# Patient Record
Sex: FEMALE | Race: WHITE
[De-identification: names, ages, dates, MRNs, and addresses within clinical notes are randomized per-mention and may not be internally consistent; named-entity substitution may affect disease eponyms.]

---

## 2017-06-12 NOTE — HP
HISTORY AND PHYSICAL:

 

DATE OF ADMISSION:  06/22/17

 

DATE OF OFFICE VISIT:  06/09/17

 

SURGEON:  Amelia Sampson MD. (DICTATED BY IMAN WORTHINGTON)

 

PROCEDURE:  Left knee arthroscopy with partial medial meniscectomy, possible 
synovectomy, possible chondroplasty.

 

CHIEF COMPLAINT:  Left knee pain.

 

HISTORY OF PRESENT ILLNESS:  Sarah Beth is a 51-year-old female with complaints of 
left knee pain.  An MRI showed a medial meniscus tear and she elected to 
proceed with left knee arthroscopy.

 

PAST MEDICAL HISTORY:  Asthma and hypertension.

 

PAST SURGICAL HISTORY:  Tubal ligation, hernia repair, teeth extraction, 
tracheostomy.

 

CURRENT MEDICATIONS:

1.  Aspirin.

2.  Pro-Air HFA.

3.  Lisinopril/hydrochlorothiazide.

4.  Albuterol.

5.  EpiPen.

 

ALLERGIES:  PENICILLIN, KEFLEX, PREDNISONE and BEE STINGS.

 

FAMILY HISTORY:  Family history of heart disease; emphysema; diabetes; lung, 
kidney, prostate cancer, and COPD.

 

SOCIAL HISTORY:  She is a 51-year-old female.  She lives with her .  She 
does not smoke or use drugs.  She uses occasional alcohol.

 

REVIEW OF SYSTEMS:  A complete 14-point review of systems is reviewed with the 
patient and was all negative and noncontributory.

 

                               PHYSICAL EXAMINATION

 

GENERAL:  She is well-developed, well-nourished, in no acute distress.

 

VITAL SIGNS:  She stands 5 feet 8 inches tall, weighs 197 pounds, her blood 
pressure is 128/81, her heart rate is 93.

 

HEENT:  Normocephalic and atraumatic.

 

NECK:  Supple.  No palpable lymph node.  Trachea is midline.

 

PULMONARY:  Lungs are clear to auscultation bilaterally.

 

CARDIO:  Regular rate and rhythm.  Strong S1, S2.  No murmurs, gallops, rubs.  
No peripheral edema.

 

ABDOMEN:  Soft, nontender, nondistended.

 

NEUROLOGIC:  She is alert and oriented x3.  Cranial nerves II through XII are 
intact.

 

MUSCULOSKELETAL:  Left lower extremity, the skin is intact.  There are no open 
wounds or abrasions.  She has some tenderness over the medial joint line.  
There is a mild joint effusion.  Her lower extremities muscle group strengths 
are intact at 5/5.  She has intact sensation.  2+ dorsalis pedis pulses.

 

 STUDIES:  An MRI of the left knee shows a medial meniscus tear and a moderate 
joint effusion.

 

ASSESSMENT AND PLAN:  Ms. Burleson is a 51-year-old female with complaints of left 
knee pain.  An MRI shows a left medial meniscus tear.  She has elected to 
proceed with a left knee arthroscopy with partial medial meniscectomy, possible 
synovectomy, and possible chondroplasty.  She will follow with Dr. Sampson 2 
weeks after the surgery.

 

 ____________________________________ IMAN WORTHINGTON

 

[id number]/[facility] #: [id number]

JUAN

## 2017-06-23 NOTE — OP
OPERATIVE NOTE:

 

DATE OF OPERATION:  06/22/17

 

DATE OF BIRTH:  [birth year]

 

ATTENDING SURGEON:  Amelia Sampson MD

 

ASSISTANT:  IMAN Florian Ms. did help throughout the procedure with preparation of the leg, wound retraction, manipu
lation of the knee, and wound closure.

 

ANESTHESIOLOGIST:  Dr. Fuller.

 

ANESTHESIA:  Spinal.

 

PRE-OP DIAGNOSIS:  Left knee pain with meniscal tear and osteoarthritis.

 

POST-OP DIAGNOSIS:  Left knee pain with displaced lateral meniscal tear, medial plica, moderate oste
oarthritis tricompartmental.

 

PROCEDURE PERFORMED:  Left knee arthroscopy with patrial lateral meniscectomy and medial plica excis
ion.

 

ESTIMATED BLOOD LOSS:  Less than 25 cc.

 

COMPLICATIONS:  None.

 

SPECIMEN:  None.

 

BRIEF HISTORY/INDICATIONS:  Ms. Mansi Villanueva is a 51-year-old female with many months of increasin
gly severe left knee pain.  Her history and physical exam were consistent with meniscal tear and MRI
 confirmed this.  She did have some baseline arthritis.  The patient elected to proceed with left kn
ee arthroscopy and partial meniscectomy with possible chondroplasty and synovectomy.  Informed conse
nt was obtained from the patient.  She understood the risks of surgery included, but were not limite
d to bleeding, infection, damage to nearby structures, continued pain, need for further surgery, str
tatiana, heart attack, blood clot, and death.  She wished to proceed.

 

Specifically, the patient understood she has continued to have some arthritic pain and may have a re
-tear of the meniscus in the future.

 

INTRAOPERATIVE FINDINGS:  Intraoperatively, the patient was noted to have moderate to severe arthrit
is in the patellofemoral compartment and medial compartment, mild to moderate arthritis in the later
al compartment.  These were grade 3 and 4 Outerbridge cartilage changes in the medial and patellofem
oral compartments.  She had a large bucket handle type tear of the body of the lateral meniscus, whi
ch was displaced into the joint and involved the white-white and white-red zone. Posterior medial me
niscus was not well visualized or easily displaced.  ACL appeared to be intact.

 

DESCRIPTION OF PROCEDURE:  Ms. Mansi Villanueva is a 51-year-old female who is identified in the [facility] unit.  Her left lower extremity was marked as the correct operative site.  Informed consent
 was signed and placed in the chart.  The patient was taken to the operating room and placed under s
harshad anesthesia without difficulty.  Her left lower extremity was prepped and draped in the usual s
terile fashion.  Preop time-out was made to correctly identify the patient's side and site.  Appropr
iate perioperative antibiotics were given within 1 hour of incision.

 

A 0.5-cm anterolateral portal incision was made with a 15-blade and carried down through the capsule
.  As soon as the light and water sources were turned on, the knee was easily visualized.  A tour of
 the knee joint was performed.  Suprapatellar pouch had no obvious abnormality.  Patellofemoral comp
artment showed some grade 3 and 4 Outerbridge cartilage changes with exposed subchondral bone.  Ther
e was a large medial plica, which did impinge with knee range of motion.  Medial gutter showed no ob
vious loose body.  Medial compartment showed some grade 3 and 4 Outerbridge cartilage changes with e
xposed subchondral bone along the medial femoral condyle.  Medial meniscus showed no obvious menisca
l tear.  ACL appeared to be intact.  The knee was placed in a figure-of-four position.  There was a 
large displaced anterior meniscal tear of the lateral meniscus.  Lateral compartment had minimal deg
enerative changes.  Lateral gutter had no obvious loose body or plica.

 

Under direct visualization, a medial portal incision was made.  A probe was introduced and a second 
tour of the knee joint was performed.  No significant posterior meniscal tear was noted along the me
dial meniscus.  Shaver and radiofrequency ablation wand were used to excise the medial plica until t
here was no longer any impingement with range of motion.  Medial and patellofemoral compartments wer
e noted to have significant amount of exposed subchondral bone. The knee was placed in [id number]
r position.  Shaver and straight biter were used to excise the bucket handle type tear of the body o
f the lateral meniscus. This was in the white-white and white-red zone.  A smooth border of the meni
scus was obtained.  Probing of the meniscus showed no further tears.

 

The knee was copiously irrigated with sterile saline.  All instruments were carefully removed.  The 
incisions were closed using interrupted 3-0 nylon suture. Intraarticular injection of 80 mg of Depo-
Medrol and 6 cc of 0.25% Marcaine was placed in the knee joint.  Sterile Xeroform, 4x4's, and Webril
 were used to cover the incision.  Ace wrap and cold pack were placed over this.

 

The patient's anesthesia was reversed without difficulty.  She was taken to the PACU in stable condi
tion.  Intended weightbearing will be weightbearing as tolerated.  Intended DVT prophylaxis will be 
aspirin.  She will follow up in 2 weeks' time for suture removal.

 

 [id number]/[facility] #: [id number]

## 2018-06-07 ENCOUNTER — HOSPITAL ENCOUNTER (EMERGENCY)
Dept: HOSPITAL 25 - UCCORT | Age: 53
Discharge: HOME | End: 2018-06-07
Payer: SELF-PAY

## 2018-06-07 VITALS — SYSTOLIC BLOOD PRESSURE: 148 MMHG | DIASTOLIC BLOOD PRESSURE: 96 MMHG

## 2018-06-07 DIAGNOSIS — Z88.1: ICD-10-CM

## 2018-06-07 DIAGNOSIS — I10: ICD-10-CM

## 2018-06-07 DIAGNOSIS — Z88.0: ICD-10-CM

## 2018-06-07 DIAGNOSIS — Z88.8: ICD-10-CM

## 2018-06-07 DIAGNOSIS — Z91.09: ICD-10-CM

## 2018-06-07 DIAGNOSIS — J45.901: Primary | ICD-10-CM

## 2018-06-07 DIAGNOSIS — R05: ICD-10-CM

## 2018-06-07 DIAGNOSIS — Z87.891: ICD-10-CM

## 2018-06-07 PROCEDURE — 87651 STREP A DNA AMP PROBE: CPT

## 2018-06-07 PROCEDURE — G0463 HOSPITAL OUTPT CLINIC VISIT: HCPCS

## 2018-06-07 PROCEDURE — 99212 OFFICE O/P EST SF 10 MIN: CPT

## 2018-06-07 NOTE — UC
Throat Pain/Nasal Sandeep HPI





- HPI Summary


HPI Summary: 





53 y/o female presents to the urgent care c/o sore throat, productive cough for 

the past 2 days. Pt reports cough now has triggered her asthma since he 

developed wheezing this morning. Pt states she has been exposed to strep at her 

daughter's . Cough worse at night, unable to sleep last night.  Pain w/ 

swallowing is 9/10. She has not done her nebulizer tx yet. Pt request a note 

for work. Pt denies fever, SOB, chest pain, abdominal pain, N/V/D. Pt has 

multiple alleries to medications. She is allergic to Prednisone PO.








- History of Current Complaint


Stated Complaint: SORE THROAT,COUGH,RESPIRATORY


Time Seen by Provider: 06/07/18 10:31


Hx Obtained From: Patient


Hx Last Menstrual Period: November


Pregnant?: No - menopausal


Onset/Duration: Gradual Onset, Lasting Days - 2 days, Still Present, Worse 

Since - yesterday


Severity: Mild


Pain Intensity: 0


Pain Scale Used: 0-10 Numeric


Cough: Sputum Appears - yellowish


Associated Signs & Symptoms: Positive: Dysphagia, Nasal Discharge





- Epiglottits Risk Factors


Epiglottis Risk Factors: Negative





- Allergies/Home Medications


Allergies/Adverse Reactions: 


 Allergies











Allergy/AdvReac Type Severity Reaction Status Date / Time


 


cephalexin Allergy  Rash & Verified 06/07/18 10:57





   throat  





   swelling  


 


Penicillins Allergy  Swelling Verified 06/07/18 10:56





   of Throat,  





   rash  


 


Perfume [Fragrance] Allergy  Coughing Verified 06/22/17 06:12


 


prednisone Allergy  Hallucinati Verified 06/07/18 10:47





   ons  


 


bee sting Allergy  Swelling Uncoded 06/07/18 10:56





   Of  





   Face,Lips,&  





   Throat  











Home Medications: 


 Home Medications





Al Hydrox/Mg Hydrox/Elin BULK* [Mylanta - BULK BOT*] 1 callie PO SEE INSTRUCTIONS 

PRN 06/07/18 [History Confirmed 06/07/18]


Multivitamin [Multivitamins] 1 cap PO DAILY 06/07/18 [History Confirmed 06/07/18

]











PMH/Surg Hx/FS Hx/Imm Hx


Previously Healthy: Yes


Cardiovascular History: Hypertension


Respiratory History: Asthma





- Surgical History


Surgical History: Yes


Surgery Procedure, Year, and Place: s/p Trach [id number].  tubal ligation.  

hernia 1999





- Family History


Known Family History: Positive: Cardiac Disease, Hypertension, Diabetes


Family History: cancer, suicide in father





- Social History


Occupation: Employed Full-time


Lives: With Family


Alcohol Use: Occasionally


Substance Use Type: None


Smoking Status (MU): Former Smoker


Amount Used/How Often: SOCIALLY X 15 YEARS


Have You Smoked in the Last Year: No


When Did the Patient Quit Smoking/Using Tobacco: 25 years ago





- Immunization History


Most Recent Influenza Vaccination: Not the 2016/2017 Season


Most Recent Tetanus Shot: unknown





Review of Systems


Constitutional: Negative


Skin: Negative


Eyes: Negative


ENT: Sore Throat, Nasal Discharge, Sinus Congestion


Respiratory: Cough - productive yellowish phlegm, Other - wheezing


Cardiovascular: Negative


Gastrointestinal: Negative


Genitourinary: Negative


Motor: Negative


Neurovascular: Negative


Musculoskeletal: Negative


Neurological: Negative


Psychological: Negative


Is Patient Immunocompromised?: No


All Other Systems Reviewed And Are Negative: Yes





Physical Exam





- Summary


Physical Exam Summary: 





Vital Signs Reviewed: Yes


General: well developed, well nourished female sitting in the examining table w/

o any apparent distress


Eyes: Positive: Conjunctiva Clear - PERRLA, EOMI, fundi grossly normal


ENT: Positive: Normal ENT inspection, Hearing grossly normal, Pharynx mild 

erythema, Nasal congestion - edematous and erythematous nasal mucosa, Nasal 

drainage - yellowish drainage, TMs normal. Negative: Tonsillar swelling, 

Tonsillar exudate


Neck: Positive: Supple, Nontender, No Lymphadenopathy


Respiratory: no orthopnea or dyspnea. Able to speak in full sentences, no 

retractions or accessory muscle use, no tripod position, stridor, or head 

bobbing. Positive breath sounds  bilaterally, scattered wheezing in B/L 

postrior lungs w/ mild rhonchi, no crackles or rales..


Cardiovascular: Positive: RRR, No Murmur, Pulses Normal, Brisk Capillary Refill


Abdomen Description: Positive: Nontender, No Organomegaly, Soft. Negative: CVA 

Tenderness (R), CVA Tenderness (L)


Bowel Sounds: Positive: Present


Musculoskeletal Exam: Normal


Musculoskeletal: Positive: Strength Intact, ROM Intact, No Edema


Neurological Exam: Normal


Psychological Exam: Normal


Skin Exam: Normal








Triage Information Reviewed: Yes





Throat Pain/Nasal Course/Dx





- Course


Course Of Treatment: 53 y/o female presents to the urgent care c/o sore throat, 

productive cough for the past 2 days. Pt reports cough now has triggered her 

asthma since he developed wheezing this morning. Pt states she has been exposed 

to strep at her daughter's . Cough worse at night, unable to sleep last 

night.  Pain w/ swallowing is 9/10. She has not done her nebulizer tx yet. Pt 

request a note for work. Pt denies fever, SOB, chest pain, abdominal pain, N/V/

D. Pt has multiple alleries to medications. She is allergic to Prednisone PO. 

Hx obtained. Pt w/ positive breath sounds  bilaterally, scattered wheezing in B/

L postrior lungs w/ mild rhonchi, no crackles or rales. O2SAt: 99%. Pt w/ 

Asthma exacerbation: due to Acute Bronchitis. Pt allergic to prednisonePO.  

Duoneb  Treatment given to patient. Patient tolerated well treatment and lungs 

improved, mild wheezing only in posterior RT lung, O2 sat 100%.   Patient 

prescribed Z-slava PO , Tessalon tabs PO and Duo neb Tx , as directed below.  The 

patient was recommended to increase fluid intake. Take medications as 

recommended and patient advised to continue treatments TID. Patient recommended 

to return to the clinic or go to the nearest ER if symptoms do not improve or 

worsen.  Patient understood and agree w/ plan of care. Note for work given. Pt 

left clinic ambulating and hemodynamically stable.





- Differential Dx/Diagnosis


Differential Diagnosis/HQI/PQRI: Influenza, Pharyngitis, Sinusitis, URI, Other 

- bronchitis, asthma exacerbation, pneumonia


Provider Diagnoses: 1- Asthma exacerbation due to bronchitis.  2- Cough.  3- 

Uncontrolled HTN





Discharge





- Sign-Out/Discharge


Documenting (check all that apply): Discharge/Admit/Transfer - D/c home





- Discharge Plan


Condition: Stable


Disposition: HOME


Prescriptions: 


Albuterol/Ipratropium NEB.SOL* [Duoneb (Albuterol 2.5 MG/Ipratropium 0.5 MG)] 1 

neb INH Q6H PRN #1 slava


 PRN Reason: Wheezing


Azithromyxin SLAVA (NF) [Z-Slava (Zithromax) 250 mg tabs #6] 2 tab PO .TODAY, THEN 

1 DAILY #6 tab


Benzonatate CAP* [Tessalon 100 MG CAP*] 100 mg PO TID PRN #21 cap


 PRN Reason: Cough


Patient Education Materials:  Asthma (ED), Acute Bronchitis (ED), Low-Sodium 

Diet (ED)


Forms:  *Work Release


Referrals: 


FHN Bell,FHN [Primary Care Provider] - 3 Days


Additional Instructions: 


1-Please take full course of antibiotic to avoid resistance. 


2-Take Tessalon PO tabs as directed and Due Duoneb nebulizer  to alleviate 

cough and wheezing. Increase fluid intake, rest and eat well. 


3- If symptoms do not improve or worsen or your develop SOB with fever and 

severe wheezing please go immediately to the ER further evaluation and 

treatment.


4- F/u with your PCP in 2-3 days for further management on your Asthma


5-Your BP is elevated today. please decrease salt in your diet, monitor BP and 

if it continues to be elevated please f/u with your PCP for further management

















- Billing Disposition and Condition


Condition: STABLE


Disposition: Home

## 2019-04-17 ENCOUNTER — HOSPITAL ENCOUNTER (EMERGENCY)
Dept: HOSPITAL 25 - UCCORT | Age: 54
Discharge: HOME | End: 2019-04-17
Payer: SELF-PAY

## 2019-04-17 VITALS — DIASTOLIC BLOOD PRESSURE: 88 MMHG | SYSTOLIC BLOOD PRESSURE: 153 MMHG

## 2019-04-17 DIAGNOSIS — J45.909: Primary | ICD-10-CM

## 2019-04-17 DIAGNOSIS — Z87.891: ICD-10-CM

## 2019-04-17 DIAGNOSIS — I10: ICD-10-CM

## 2019-04-17 DIAGNOSIS — Z88.0: ICD-10-CM

## 2019-04-17 PROCEDURE — G0463 HOSPITAL OUTPT CLINIC VISIT: HCPCS

## 2019-04-17 PROCEDURE — 99212 OFFICE O/P EST SF 10 MIN: CPT

## 2019-04-17 NOTE — UC
Respiratory Complaint HPI





- HPI Summary


HPI Summary: 


53-year-old female comes in with a chief complaint of upper respiratory tract 

infection symptoms for 2 days.  Patient has asthma and she's had increased 

wheezing.  Patient reports allergies to penicillin also is not able to take 

penicillin for her asthma exacerbations.  She is not a smoker.  She's had some 

chills but no fevers.  Her albuterol does help with the symptoms.





- History of Current Complaint


Chief Complaint: UCRespiratory


Stated Complaint: CONGESTION,COUGH,WHEEZY


Time Seen by Provider: 04/17/19 16:41


Hx Last Menstrual Period: November


Pain Intensity: 5





- Allergies/Home Medications


Allergies/Adverse Reactions: 


 Allergies











Allergy/AdvReac Type Severity Reaction Status Date / Time


 


bee venom protein (honey bee) Allergy  Swelling Verified 04/17/19 16:37





   Of  





   Face,Lips,&  





   Throat  


 


cephalexin Allergy  Rash & Verified 04/17/19 16:37





   throat  





   swelling  


 


Penicillins Allergy  Swelling Verified 04/17/19 16:37





   of Throat,  





   rash  


 


Perfume [Fragrance] Allergy  Coughing Verified 04/17/19 16:37


 


prednisone Allergy  Hallucinati Verified 04/17/19 16:37





   ons  














PMH/Surg Hx/FS Hx/Imm Hx


Previously Healthy: Yes


Cardiovascular History: Hypertension


Respiratory History: Asthma





- Surgical History


Surgical History: Yes


Surgery Procedure, Year, and Place: s/p Trach [id number].  tubal ligation.  

hernia 1999





- Family History


Known Family History: Positive: Cardiac Disease, Hypertension, Diabetes, Other 

- cancer, suicide in father


Family History: cancer, suicide in father





- Social History


Alcohol Use: Occasionally


Substance Use Type: None


Smoking Status (MU): Former Smoker


Amount Used/How Often: SOCIALLY X 15 YEARS


Have You Smoked in the Last Year: No


When Did the Patient Quit Smoking/Using Tobacco: 25 years ago





- Immunization History


Most Recent Influenza Vaccination: Not the 2016/2017 Season


Most Recent Tetanus Shot: unknown





Review of Systems


All Other Systems Reviewed And Are Negative: Yes


Constitutional: Positive: Chills


Skin: Positive: Negative


Eyes: Positive: Negative


ENT: Positive: Nasal Discharge, Sinus Congestion


Respiratory: Positive: Shortness Of Breath, Cough, Other - SEE HPI


Cardiovascular: Positive: Negative


Gastrointestinal: Positive: Negative


Motor: Positive: Negative


Neurovascular: Positive: Negative


Musculoskeletal: Positive: Negative.  Negative: Edema


Neurological: Positive: Negative


Psychological: Positive: Negative


Is Patient Immunocompromised?: No





Physical Exam


Triage Information Reviewed: Yes


Appearance: No Pain Distress, Well-Nourished, Ill-Appearing - MILD


Vital Signs: 


 Initial Vital Signs











Temp  98.5 F   04/17/19 16:23


 


Pulse  96   04/17/19 16:23


 


Resp  22   04/17/19 16:23


 


BP  153/88   04/17/19 16:23


 


Pulse Ox  98   04/17/19 16:23











Vital Signs Reviewed: Yes


Eye Exam: Normal


Eyes: Positive: Conjunctiva Clear


ENT: Positive: Pharyngeal erythema, Nasal congestion, Nasal drainage, TMs normal


Neck: Positive: Supple


Respiratory: Positive: No respiratory distress, Rhonchi


Cardiovascular: Positive: RRR


Musculoskeletal Exam: Normal


Musculoskeletal: Positive: Strength Intact, ROM Intact, No Edema, Other: - NO 

CALF TENDERNESS


Neurological: Positive: Alert


Psychological Exam: Normal


Psychological: Positive: Age Appropriate Behavior


Skin Exam: Normal





Respiratory Course/Dx





- Course


Course Of Treatment: 


DISCUSSED VIRAL VERSES BACTERIAL INFECTION AND THE ROLE OF ANTIBIOTICS. 


THE PATIENT WISHES TO BE ON ANTIBIOTICS AT THIS TIME.


WE DISCUSSED CXR. AT THIS TIME, THE PATIENT PREFERS NO CXR.





- Differential Dx/Diagnosis


Provider Diagnosis: 


 Bronchitis, Asthma








Discharge





- Sign-Out/Discharge


Documenting (check all that apply): Patient Departure


All imaging exams completed and their final reports reviewed: No Studies





- Discharge Plan


Condition: Stable


Disposition: HOME


Prescriptions: 


Azithromyxin MIKE (NF) [Z-Mike (Zithromax) 250 mg tabs #6] 2 tab PO .TODAY, THEN 

1 DAILY #6 tab


Patient Education Materials:  Asthma (ED), Acute Bronchitis (ED)


Referrals: 


Xochitl Prado MD [Primary Care Provider] - 


Additional Instructions: 


FOLLOW UP WITH YOUR DOCTOR IF NOT COMPLETELY IMPROVED.


GET REEVALUATED SOONER IF YOUR CONDITION WORSENS OR ANY QUESTIONS OR CONCERNS.








- Billing Disposition and Condition


Condition: STABLE


Disposition: Home